# Patient Record
Sex: FEMALE | URBAN - METROPOLITAN AREA
[De-identification: names, ages, dates, MRNs, and addresses within clinical notes are randomized per-mention and may not be internally consistent; named-entity substitution may affect disease eponyms.]

---

## 2023-06-23 ENCOUNTER — APPOINTMENT (RX ONLY)
Dept: URBAN - METROPOLITAN AREA CLINIC 43 | Facility: CLINIC | Age: 53
Setting detail: DERMATOLOGY
End: 2023-06-23

## 2023-06-23 DIAGNOSIS — L91.8 OTHER HYPERTROPHIC DISORDERS OF THE SKIN: ICD-10-CM

## 2023-06-23 PROCEDURE — 99202 OFFICE O/P NEW SF 15 MIN: CPT

## 2023-06-23 PROCEDURE — ? ADDITIONAL NOTES

## 2023-06-23 PROCEDURE — ? COUNSELING

## 2023-06-23 ASSESSMENT — LOCATION DETAILED DESCRIPTION DERM: LOCATION DETAILED: RIGHT INFERIOR ANTERIOR NECK

## 2023-06-23 ASSESSMENT — LOCATION ZONE DERM: LOCATION ZONE: NECK

## 2023-06-23 ASSESSMENT — LOCATION SIMPLE DESCRIPTION DERM: LOCATION SIMPLE: RIGHT ANTERIOR NECK

## 2023-06-23 NOTE — PROCEDURE: ADDITIONAL NOTES
Additional Notes: Patient reports very itchy\\n\\nDiscussed the nature of the condition and treatment options \\nDiscussed r/b/se\\n\\n\\n\\nDiscussed that the removal of Skin tags is cosmetic 150.00 per 15 skin tags
Detail Level: Simple
Render Risk Assessment In Note?: no